# Patient Record
(demographics unavailable — no encounter records)

---

## 2025-01-16 NOTE — ASSESSMENT
[FreeTextEntry1] : 55-year-old male with a recent left shoulder dislocation    -Discussed diagnosis, natural history of condition, and treatment options -Recommend regular range of motion as tolerated.  Does not need any immobilization.  PT prescription provided to work on shoulder range of motion as well as to work out the strain he has in his rhomboids on the right side. -I explained he should not lift anything heavy or abduct and externally rotate his arm for the next 3 to 4 weeks.  At that point he can return to all activities as tolerated -Explained that if any issues develop down the line with regards to recurrent instability he should see me again which may require further intervention. -All questions answered, patient in agreement

## 2025-01-16 NOTE — HISTORY OF PRESENT ILLNESS
[Right] : right hand dominant [FreeTextEntry1] : Left shoulder dislocation.  DOI: 1/11/24 SRINI: arline Has a history of shoulder dislocation when he was a teenager Had it reduced at local ED without issue Had the shoulder in a sling for 36 hours but is been out of the sling.  Reports some pain with overhead motion.  He also endorses pain over the right contralateral scapula.

## 2025-01-16 NOTE — PHYSICAL EXAM
[de-identified] : Physical Exam   Gen: NAD Resp: Nonlabored Cards: WWP  Left shoulder   Skin intact, no lesions Normal deltoid contour  NTTP AC joint, GT/rotator cuff insertion, biceps, coracoid, posterior scapula  No pain with neck ROM (full ROM)    ER arm at side 70 IR to T8 No limitations in passive ROM Negative drop arm test Negative Speeds Supraspinatus impingement signs- Some apprehension with abduction external rotation 5/5 rotator cuff strength WWP No numbness [de-identified] :  3 views of the left shoulder were obtained and reviewed in clinic showing no fractures or dislocations, preserved joint spaces, no lesions, and no soft tissue abnormalities

## 2025-03-31 NOTE — PHYSICAL EXAM
[de-identified] : Physical Exam   Gen: NAD Resp: Nonlabored Cards: WWP   Left Shoulder   Skin intact, no lesions Normal deltoid contour   NTTP AC joint, GT/rotator cuff insertion, biceps, coracoid, posterior scapula  No pain with neck ROM (full ROM)    ER arm at side to neutral IR to L5 Passive = active ROM Negative drop arm test Negative Speeds Supraspinatus impingement signs - Neers - Torres- 5/5 rotator cuff strength WWP No numbness

## 2025-03-31 NOTE — ASSESSMENT
[FreeTextEntry1] : 56-year-old male with a left shoulder dislocation  -Patient is developing some stiffness which I explained sometimes happens after these dislocations.  I recommend continued therapy as well as a Medrol Dosepak to help accelerate his progress.  I think he needs some aggressive therapy to work on his range of motion.  I am not concerned for any rotator cuff injury as his rotator cuff strength is excellent and his passive range of motion of digits is limited as he is active.  If no improvement in the next 2 months he will return for repeat evaluation.  All questions answered patient agreed with

## 2025-03-31 NOTE — HISTORY OF PRESENT ILLNESS
[FreeTextEntry1] : DOI: 1/11/24 SRINI: skiing  Patient is been going to therapy twice a week for shoulder range of motion exercises.  He initially felt he was doing well but now is felt his progress has plateaued.  He has some stiffness and pain in the shoulder with certain activities such as this when trying to externally rotate the shoulder in particular.